# Patient Record
Sex: FEMALE | URBAN - METROPOLITAN AREA
[De-identification: names, ages, dates, MRNs, and addresses within clinical notes are randomized per-mention and may not be internally consistent; named-entity substitution may affect disease eponyms.]

---

## 2024-07-08 ENCOUNTER — APPOINTMENT (OUTPATIENT)
Dept: PRIMARY CARE | Facility: CLINIC | Age: 29
End: 2024-07-08
Payer: COMMERCIAL

## 2024-07-08 VITALS
TEMPERATURE: 97.5 F | DIASTOLIC BLOOD PRESSURE: 73 MMHG | HEART RATE: 81 BPM | WEIGHT: 201 LBS | SYSTOLIC BLOOD PRESSURE: 114 MMHG

## 2024-07-08 DIAGNOSIS — Z13.220 SCREENING CHOLESTEROL LEVEL: ICD-10-CM

## 2024-07-08 DIAGNOSIS — Z11.59 ENCOUNTER FOR HEPATITIS C SCREENING TEST FOR LOW RISK PATIENT: ICD-10-CM

## 2024-07-08 DIAGNOSIS — R73.03 PREDIABETES: ICD-10-CM

## 2024-07-08 DIAGNOSIS — Z11.4 ENCOUNTER FOR SCREENING FOR HIV: ICD-10-CM

## 2024-07-08 DIAGNOSIS — Z13.1 DIABETES MELLITUS SCREENING: ICD-10-CM

## 2024-07-08 DIAGNOSIS — Z00.00 HEALTH CARE MAINTENANCE: Primary | ICD-10-CM

## 2024-07-08 DIAGNOSIS — R42 DIZZINESS: ICD-10-CM

## 2024-07-08 PROCEDURE — 99385 PREV VISIT NEW AGE 18-39: CPT | Performed by: FAMILY MEDICINE

## 2024-07-08 PROCEDURE — 99203 OFFICE O/P NEW LOW 30 MIN: CPT | Performed by: FAMILY MEDICINE

## 2024-07-08 RX ORDER — BUPROPION HYDROCHLORIDE 150 MG/1
150 TABLET ORAL
COMMUNITY
Start: 2023-08-25

## 2024-07-08 RX ORDER — METHYLPHENIDATE HYDROCHLORIDE 27 MG/1
27 TABLET ORAL DAILY
COMMUNITY
Start: 2023-07-14

## 2024-07-08 RX ORDER — BUPROPION HYDROCHLORIDE 300 MG/1
300 TABLET ORAL
COMMUNITY

## 2024-07-08 RX ORDER — METFORMIN HYDROCHLORIDE 500 MG/1
1000 TABLET, EXTENDED RELEASE ORAL
COMMUNITY
Start: 2023-12-20

## 2024-07-08 RX ORDER — FLUTICASONE PROPIONATE 50 MCG
1 SPRAY, SUSPENSION (ML) NASAL
COMMUNITY
Start: 2022-12-13

## 2024-07-08 NOTE — ASSESSMENT & PLAN NOTE
-pap smear was in 2021.  Scheduled for 7/15/24.  Has a IUD in place.   -blood test ordered.  -Immunizations: COVID 19 recommended.

## 2024-07-08 NOTE — PROGRESS NOTES
Subjective   Patient ID: Roseline Stoner is a 28 y.o. female who presents for Establish Care and CPE.  HPI  The patient is a 29 yo Female with a history of PCOS, prediabetes, allergic rhinitis, here for establishment of care and for:    1- CPE.  -pap smear was in 2021.  Scheduled for 7/15/24.  Has a IUD in place.   -blood test ordered.  -Immunizations: COVID 19 recommended.    2- management of a dizziness.  Worse with standing up motion.  Patient also states that she frequently wakes up with her heart racing.  No associated chest pain.     3- Prediabetes. Patient is currently managing her elevated blood sugar levels with her life style changes. Last A1C: 5.8% in 11/2023.    A review of system was completed.  All systems were reviewed and were normal, except for the ones that are listed in the HPI.    Objective   Physical Exam  Constitutional:       Appearance: Normal appearance.   HENT:      Head: Normocephalic and atraumatic.      Right Ear: Tympanic membrane, ear canal and external ear normal.      Left Ear: Tympanic membrane, ear canal and external ear normal.      Nose: Nose normal.      Mouth/Throat:      Mouth: Mucous membranes are moist.      Pharynx: Oropharynx is clear.   Eyes:      Extraocular Movements: Extraocular movements intact.      Conjunctiva/sclera: Conjunctivae normal.      Pupils: Pupils are equal, round, and reactive to light.   Cardiovascular:      Rate and Rhythm: Normal rate and regular rhythm.      Pulses: Normal pulses.   Pulmonary:      Effort: Pulmonary effort is normal.      Breath sounds: Normal breath sounds.   Abdominal:      General: Abdomen is flat. Bowel sounds are normal.      Palpations: Abdomen is soft.   Musculoskeletal:         General: Normal range of motion.      Cervical back: Normal range of motion and neck supple.   Skin:     General: Skin is warm.   Neurological:      General: No focal deficit present.      Mental Status: She is alert and oriented to person, place,  and time. Mental status is at baseline.   Psychiatric:         Mood and Affect: Mood normal.         Behavior: Behavior normal.         Thought Content: Thought content normal.         Judgment: Judgment normal.     Assessment/Plan   Problem List Items Addressed This Visit       Health care maintenance - Primary     -pap smear was in 2021.  Scheduled for 7/15/24.  Has a IUD in place.   -blood test ordered.  -Immunizations: COVID 19 recommended.         Screening cholesterol level    Relevant Orders    Lipid Panel    Diabetes mellitus screening    Encounter for hepatitis C screening test for low risk patient    Relevant Orders    Hepatitis C Antibody    Encounter for screening for HIV    Relevant Orders    HIV 1/2 Antigen/Antibody Screen with Reflex to Confirmation    Dizziness     -Tilt table test ordered.  -EKG:  -blood test ordered.  -Holter monitor ordered.         Relevant Orders    Vitamin B12    Vitamin D 25-Hydroxy,Total (for eval of Vitamin D levels)    Comprehensive Metabolic Panel    TSH with reflex to Free T4 if abnormal    Autonomic Testing    Magnesium    Phosphorus    Holter Or Event Cardiac Monitor    Prediabetes     -diet and life style changes discussed.   -blood test ordered.         Relevant Orders    Hemoglobin A1C    Patient to return to office in

## 2024-07-22 ENCOUNTER — APPOINTMENT (OUTPATIENT)
Dept: OBSTETRICS AND GYNECOLOGY | Facility: CLINIC | Age: 29
End: 2024-07-22
Payer: COMMERCIAL

## 2024-07-22 VITALS — DIASTOLIC BLOOD PRESSURE: 70 MMHG | BODY MASS INDEX: 32.44 KG/M2 | SYSTOLIC BLOOD PRESSURE: 125 MMHG | HEIGHT: 66 IN

## 2024-07-22 DIAGNOSIS — R10.2 PELVIC PAIN: ICD-10-CM

## 2024-07-22 DIAGNOSIS — L68.0 HIRSUTISM: ICD-10-CM

## 2024-07-22 DIAGNOSIS — Z01.419 ENCOUNTER FOR ANNUAL ROUTINE GYNECOLOGICAL EXAMINATION: Primary | ICD-10-CM

## 2024-07-22 PROCEDURE — 88175 CYTOPATH C/V AUTO FLUID REDO: CPT

## 2024-07-22 PROCEDURE — 1036F TOBACCO NON-USER: CPT | Performed by: OBSTETRICS & GYNECOLOGY

## 2024-07-22 PROCEDURE — 99385 PREV VISIT NEW AGE 18-39: CPT | Performed by: OBSTETRICS & GYNECOLOGY

## 2024-07-22 ASSESSMENT — ENCOUNTER SYMPTOMS
LOSS OF SENSATION IN FEET: 0
DEPRESSION: 0
OCCASIONAL FEELINGS OF UNSTEADINESS: 0

## 2024-07-22 ASSESSMENT — PATIENT HEALTH QUESTIONNAIRE - PHQ9
1. LITTLE INTEREST OR PLEASURE IN DOING THINGS: NOT AT ALL
SUM OF ALL RESPONSES TO PHQ9 QUESTIONS 1 AND 2: 0
2. FEELING DOWN, DEPRESSED OR HOPELESS: NOT AT ALL

## 2024-07-22 ASSESSMENT — COLUMBIA-SUICIDE SEVERITY RATING SCALE - C-SSRS
1. IN THE PAST MONTH, HAVE YOU WISHED YOU WERE DEAD OR WISHED YOU COULD GO TO SLEEP AND NOT WAKE UP?: NO
2. HAVE YOU ACTUALLY HAD ANY THOUGHTS OF KILLING YOURSELF?: NO
6. HAVE YOU EVER DONE ANYTHING, STARTED TO DO ANYTHING, OR PREPARED TO DO ANYTHING TO END YOUR LIFE?: NO

## 2024-07-22 NOTE — PATIENT INSTRUCTIONS
Thanks for coming in today for your annual Gyn exam.     A Pap was sent and results should be available in the next few weeks.     Follow up with your PCP and other health care specialist as needed.     Feel free to call the office with any problems, questions or concerns prior to your next scheduled visit.

## 2024-07-22 NOTE — PROGRESS NOTES
29 yo G0  woman presents today for annual GYN exam.  She would like testing for PCOS - She reports increased facial hair temporal hair thinning and left ovarian/left lower quadrant pain during her menses.    She has a Mirena IUD in place since 2021.    GynHx: Menarche began at age 13.  She has monthly cycles.  She denies any STIs, PID, abnormal Pap smears or sexual abuse.  She is sexually active with 1 male partner.  She has a Mirena IUD in place.  She received the HPV vaccine.    Subjective   Patient ID: Roseline Stoner is a 28 y.o. female who presents for New Patient Visit (Annual Exam /Pap none on file //Claudia Ngo MA ).  HPI    Review of Systems    Objective   Physical Exam  Exam conducted with a chaperone present.   HENT:      Head: Normocephalic.      Right Ear: External ear normal.      Left Ear: External ear normal.      Nose: Nose normal.      Mouth/Throat:      Mouth: Mucous membranes are moist.   Eyes:      Extraocular Movements: Extraocular movements intact.      Pupils: Pupils are equal, round, and reactive to light.   Cardiovascular:      Rate and Rhythm: Normal rate and regular rhythm.      Heart sounds: Normal heart sounds.   Pulmonary:      Breath sounds: Normal breath sounds.   Chest:      Comments: Pt remained with top and bra on, thus exam deferred. She denied any masses or concerns.   Abdominal:      Palpations: Abdomen is soft.   Genitourinary:     General: Normal vulva.      Labia:         Right: No rash or lesion.         Left: No rash or lesion.       Vagina: Normal.      Cervix: Normal. No cervical motion tenderness or lesion.      Uterus: Normal.       Adnexa: Right adnexa normal and left adnexa normal.   Musculoskeletal:         General: Normal range of motion.      Cervical back: Normal range of motion and neck supple.   Skin:     General: Skin is warm and dry.   Neurological:      General: No focal deficit present.      Mental Status: She is alert and oriented to  person, place, and time.   Psychiatric:         Mood and Affect: Mood normal.         Behavior: Behavior normal.     A/P: APE     -  Pap sent     -  PCP F/U     -  IUD to remain in place    Increased facial hair     -  PCOS labs     -  US check IUD placement, evaluate LLQ pain and check for cyst

## 2024-08-02 LAB
CYTOLOGY CMNT CVX/VAG CYTO-IMP: NORMAL
LAB AP CONTRACEPTIVE HISTORY: NORMAL
LAB AP HPV GENOTYPE QUESTION: YES
LAB AP HPV HR: NORMAL
LABORATORY COMMENT REPORT: NORMAL
LMP START DATE: NORMAL
PATH REPORT.TOTAL CANCER: NORMAL

## 2025-05-08 ASSESSMENT — ENCOUNTER SYMPTOMS
POLYDIPSIA: 0
POLYPHAGIA: 1
WEIGHT LOSS: 0
SWEATS: 0
HEADACHES: 1
CONFUSION: 0
WEAKNESS: 0
FATIGUE: 1
TREMORS: 0
BLACKOUTS: 0
SEIZURES: 0
BLURRED VISION: 1
HUNGER: 1
DIZZINESS: 1
NERVOUS/ANXIOUS: 1
VISUAL CHANGE: 0

## 2025-05-09 ENCOUNTER — OFFICE VISIT (OUTPATIENT)
Dept: PRIMARY CARE | Facility: CLINIC | Age: 30
End: 2025-05-09
Payer: COMMERCIAL

## 2025-05-09 VITALS
WEIGHT: 195 LBS | TEMPERATURE: 98 F | SYSTOLIC BLOOD PRESSURE: 113 MMHG | HEART RATE: 105 BPM | BODY MASS INDEX: 31.47 KG/M2 | DIASTOLIC BLOOD PRESSURE: 73 MMHG

## 2025-05-09 DIAGNOSIS — R73.03 PREDIABETES: ICD-10-CM

## 2025-05-09 DIAGNOSIS — R42 DIZZINESS: ICD-10-CM

## 2025-05-09 DIAGNOSIS — R63.5 WEIGHT GAIN: ICD-10-CM

## 2025-05-09 DIAGNOSIS — E11.9 CONTROLLED TYPE 2 DIABETES MELLITUS WITHOUT COMPLICATION, WITHOUT LONG-TERM CURRENT USE OF INSULIN: Primary | ICD-10-CM

## 2025-05-09 LAB — POC HEMOGLOBIN A1C: 7 % (ref 4.2–6.5)

## 2025-05-09 PROCEDURE — 99214 OFFICE O/P EST MOD 30 MIN: CPT | Performed by: FAMILY MEDICINE

## 2025-05-09 PROCEDURE — 3074F SYST BP LT 130 MM HG: CPT | Performed by: FAMILY MEDICINE

## 2025-05-09 PROCEDURE — 1036F TOBACCO NON-USER: CPT | Performed by: FAMILY MEDICINE

## 2025-05-09 PROCEDURE — 83036 HEMOGLOBIN GLYCOSYLATED A1C: CPT | Performed by: FAMILY MEDICINE

## 2025-05-09 PROCEDURE — 3078F DIAST BP <80 MM HG: CPT | Performed by: FAMILY MEDICINE

## 2025-05-09 PROCEDURE — 3051F HG A1C>EQUAL 7.0%<8.0%: CPT | Performed by: FAMILY MEDICINE

## 2025-05-09 RX ORDER — METFORMIN HYDROCHLORIDE 500 MG/1
500 TABLET, EXTENDED RELEASE ORAL
Qty: 100 TABLET | Refills: 1 | Status: SHIPPED | OUTPATIENT
Start: 2025-05-09 | End: 2026-06-13

## 2025-05-09 RX ORDER — LANCETS
EACH MISCELLANEOUS
Qty: 100 EACH | Refills: 3 | Status: SHIPPED | OUTPATIENT
Start: 2025-05-09

## 2025-05-09 RX ORDER — DEXTROSE 4 G
TABLET,CHEWABLE ORAL
Qty: 1 EACH | Refills: 0 | Status: SHIPPED | OUTPATIENT
Start: 2025-05-09

## 2025-05-09 ASSESSMENT — ENCOUNTER SYMPTOMS
WEAKNESS: 0
TREMORS: 0
DIZZINESS: 1
BLACKOUTS: 0
POLYDIPSIA: 0
CONFUSION: 0
VISUAL CHANGE: 0
BLURRED VISION: 1
NERVOUS/ANXIOUS: 1
HUNGER: 1
POLYPHAGIA: 1
WEIGHT LOSS: 0
SWEATS: 0
FATIGUE: 1
SEIZURES: 0
HEADACHES: 1

## 2025-05-09 NOTE — PROGRESS NOTES
Subjective   Patient ID: Roseline Stoner is a 29 y.o. female who presents for Follow-up.  Diabetes  She has type 1 diabetes mellitus. No MedicAlert identification noted. The initial diagnosis of diabetes was made 5 years ago. Hypoglycemia symptoms include dizziness, headaches, hunger, mood changes, nervousness/anxiousness and sleepiness. Pertinent negatives for hypoglycemia include no confusion, pallor, seizures, sweats or tremors. Associated symptoms include blurred vision, fatigue and polyphagia. Pertinent negatives for diabetes include no chest pain, no foot paresthesias, no foot ulcerations, no polydipsia, no polyuria, no visual change, no weakness and no weight loss. Pertinent negatives for hypoglycemia complications include no blackouts, no hospitalization, no nocturnal hypoglycemia, no required assistance and no required glucagon injection. Symptoms are worsening. Diabetic complications include peripheral neuropathy and retinopathy. Pertinent negatives for diabetic complications include no CVA, heart disease, impotence, nephropathy or PVD. Risk factors for coronary artery disease include family history and obesity. When asked about current treatments, none were reported. She is compliant with treatment some of the time. Her weight is fluctuating minimally. She has not had a previous visit with a dietitian. Her breakfast blood glucose is taken after 10 am. Her lunch blood glucose is taken between 2-3 pm. Her dinner blood glucose is taken after 8 pm. Her bedtime blood glucose is taken between 10-11 pm. She does not see a podiatrist.Eye exam is current.     The patient is a 30 yo Female with a history of prediabetes, allergic rhinitis, here for the management of:    1- her elevated blood sugar and elevated weight.  2- abnormal facial hair.  3- dizziness when standing up.     A review of system was completed.  All systems were reviewed and were normal, except for the ones that are listed in the  HPI.    Objective   Physical Exam  Constitutional:       Appearance: Normal appearance.   HENT:      Head: Normocephalic and atraumatic.      Right Ear: Tympanic membrane, ear canal and external ear normal.      Left Ear: Tympanic membrane, ear canal and external ear normal.      Nose: Nose normal.      Mouth/Throat:      Mouth: Mucous membranes are moist.      Pharynx: Oropharynx is clear.   Eyes:      Extraocular Movements: Extraocular movements intact.      Conjunctiva/sclera: Conjunctivae normal.      Pupils: Pupils are equal, round, and reactive to light.   Cardiovascular:      Rate and Rhythm: Normal rate and regular rhythm.      Pulses: Normal pulses.   Pulmonary:      Effort: Pulmonary effort is normal.      Breath sounds: Normal breath sounds.   Abdominal:      General: Abdomen is flat. Bowel sounds are normal.      Palpations: Abdomen is soft.   Musculoskeletal:         General: Normal range of motion.      Cervical back: Normal range of motion and neck supple.   Skin:     General: Skin is warm.   Neurological:      General: No focal deficit present.      Mental Status: She is alert and oriented to person, place, and time. Mental status is at baseline.   Psychiatric:         Mood and Affect: Mood normal.         Behavior: Behavior normal.         Thought Content: Thought content normal.         Judgment: Judgment normal.         Assessment/Plan   Problem List Items Addressed This Visit       Dizziness    Relevant Orders    Autonomic Testing    Prediabetes    Relevant Orders    Referral to Endocrinology    Testosterone, total and free    DHEA-Sulfate    CBC    Comprehensive Metabolic Panel    Hemoglobin A1C    TSH with reflex to Free T4 if abnormal    LDL cholesterol, direct    POCT glycosylated hemoglobin (Hb A1C) manually resulted (Completed)    Weight gain    Relevant Medications    metFORMIN XR (Glucophage-XR) 500 mg 24 hr tablet    blood-glucose meter misc    blood sugar diagnostic    lancets misc     Other Relevant Orders    Referral to Endocrinology    Testosterone, total and free    DHEA-Sulfate    CBC    Comprehensive Metabolic Panel    Hemoglobin A1C    TSH with reflex to Free T4 if abnormal    LDL cholesterol, direct    Referral to Diabetes Education    Controlled type 2 diabetes mellitus without complication, without long-term current use of insulin - Primary    - Newly diagnosed.  Hemoglobin A1c today was at 7.0% in the office.    -The patient was referred to the diabetes education class.  -Metformin  mg daily started today.  -Continue diet and lifestyle modifications.  -Follow-up in 6 weeks for reassessment.         Relevant Medications    metFORMIN XR (Glucophage-XR) 500 mg 24 hr tablet    blood-glucose meter misc    blood sugar diagnostic    lancets misc    Other Relevant Orders    Referral to Diabetes Education    Patient to return to office in 1 months.

## 2025-05-09 NOTE — ASSESSMENT & PLAN NOTE
- Newly diagnosed.  Hemoglobin A1c today was at 7.0% in the office.    -The patient was referred to the diabetes education class.  -Metformin  mg daily started today.  -Continue diet and lifestyle modifications.  -Follow-up in 6 weeks for reassessment.

## 2025-06-09 ENCOUNTER — APPOINTMENT (OUTPATIENT)
Dept: PRIMARY CARE | Facility: CLINIC | Age: 30
End: 2025-06-09
Payer: COMMERCIAL

## 2025-06-25 ENCOUNTER — HOSPITAL ENCOUNTER (OUTPATIENT)
Dept: RADIOLOGY | Facility: CLINIC | Age: 30
Discharge: HOME | End: 2025-06-25
Payer: COMMERCIAL

## 2025-06-25 ENCOUNTER — OFFICE VISIT (OUTPATIENT)
Dept: URGENT CARE | Age: 30
End: 2025-06-25
Payer: COMMERCIAL

## 2025-06-25 VITALS
TEMPERATURE: 98.4 F | SYSTOLIC BLOOD PRESSURE: 120 MMHG | HEART RATE: 78 BPM | DIASTOLIC BLOOD PRESSURE: 81 MMHG | OXYGEN SATURATION: 98 % | RESPIRATION RATE: 19 BRPM

## 2025-06-25 DIAGNOSIS — S99.922A TOE INJURY, LEFT, INITIAL ENCOUNTER: ICD-10-CM

## 2025-06-25 DIAGNOSIS — S99.922A TOE INJURY, LEFT, INITIAL ENCOUNTER: Primary | ICD-10-CM

## 2025-06-25 PROCEDURE — 3074F SYST BP LT 130 MM HG: CPT | Performed by: FAMILY MEDICINE

## 2025-06-25 PROCEDURE — 73660 X-RAY EXAM OF TOE(S): CPT | Mod: LEFT SIDE | Performed by: RADIOLOGY

## 2025-06-25 PROCEDURE — 3079F DIAST BP 80-89 MM HG: CPT | Performed by: FAMILY MEDICINE

## 2025-06-25 PROCEDURE — 73660 X-RAY EXAM OF TOE(S): CPT | Mod: LT

## 2025-06-25 PROCEDURE — 99203 OFFICE O/P NEW LOW 30 MIN: CPT | Performed by: FAMILY MEDICINE

## 2025-06-25 PROCEDURE — 3051F HG A1C>EQUAL 7.0%<8.0%: CPT | Performed by: FAMILY MEDICINE

## 2025-06-25 PROCEDURE — 1036F TOBACCO NON-USER: CPT | Performed by: FAMILY MEDICINE

## 2025-06-25 ASSESSMENT — PAIN SCALES - GENERAL: PAINLEVEL_OUTOF10: 7

## 2025-06-25 NOTE — PROGRESS NOTES
HPI:  Patient states that last night she accidentally dropped her laptop and the corner of it hit her left big toe. Pt c/o pain/swelling in the left big toe.  No numbness/weakness.  +pain with weight bearing. +bruising.      ROS:  No numbness/weakness in the toe  +pain with walking    PE:    A&O x3  NCAT  No conjunctival erythema  Normal respiratory effort  +swelling/bruising/tndop over left big toe distally  Small hematoma under left big toe nailbed  Preserved motion in the left big toe, but +pain with flexion/extension   No focal deficit  Judgement normal    Results:  Xray left big toe: initial read no fx     Procedure:  After informed verbal consent from pt left big toe nail cleaned with Betadine and hand cautery used to express hematoma; no blood was expressed on exam.  Pt tolerated well.  No complications.       A/P:   Left big toe injury    We will call you with xray results if you need further treatment.  Ice.  Elevate.  Rest.  Tylenol/Motrin as needed for pain.  Follow up with orthopedist for further evaluation in 2 weeks if symptoms persist.  Keep a diary of symptoms.  Go to the ER if starts getting worse.

## 2025-06-25 NOTE — LETTER
June 25, 2025     Patient: Roseline Stoner   YOB: 1995   Date of Visit: 6/25/2025       To Whom It May Concern:    Roseline Stoner was seen in my clinic on 6/25/2025 at 9:30 am. Please excuse Roseline for her absence from work on this day to make the appointment. Roseline may return to work on 6/27/2025.    If you have any questions or concerns, please don't hesitate to call.         Sincerely,         Rox Francois MD        CC: No Recipients

## 2025-07-30 ENCOUNTER — APPOINTMENT (OUTPATIENT)
Dept: PRIMARY CARE | Facility: CLINIC | Age: 30
End: 2025-07-30
Payer: COMMERCIAL